# Patient Record
Sex: FEMALE | Race: WHITE
[De-identification: names, ages, dates, MRNs, and addresses within clinical notes are randomized per-mention and may not be internally consistent; named-entity substitution may affect disease eponyms.]

---

## 2019-10-23 ENCOUNTER — HOSPITAL ENCOUNTER (INPATIENT)
Dept: HOSPITAL 74 - YASAS | Age: 44
LOS: 23 days | Discharge: HOME | DRG: 772 | End: 2019-11-15
Attending: NEUROMUSCULOSKELETAL MEDICINE & OMM | Admitting: NEUROMUSCULOSKELETAL MEDICINE & OMM
Payer: COMMERCIAL

## 2019-10-23 VITALS — BODY MASS INDEX: 25.4 KG/M2

## 2019-10-23 DIAGNOSIS — R76.11: ICD-10-CM

## 2019-10-23 DIAGNOSIS — F64.0: ICD-10-CM

## 2019-10-23 DIAGNOSIS — F43.10: ICD-10-CM

## 2019-10-23 DIAGNOSIS — B18.2: ICD-10-CM

## 2019-10-23 DIAGNOSIS — F10.20: ICD-10-CM

## 2019-10-23 DIAGNOSIS — H55.00: ICD-10-CM

## 2019-10-23 DIAGNOSIS — B35.3: ICD-10-CM

## 2019-10-23 DIAGNOSIS — L21.0: ICD-10-CM

## 2019-10-23 DIAGNOSIS — F11.20: Primary | ICD-10-CM

## 2019-10-23 DIAGNOSIS — L98.8: ICD-10-CM

## 2019-10-23 DIAGNOSIS — F32.9: ICD-10-CM

## 2019-10-23 DIAGNOSIS — F14.10: ICD-10-CM

## 2019-10-23 DIAGNOSIS — Z88.0: ICD-10-CM

## 2019-10-23 DIAGNOSIS — L05.01: ICD-10-CM

## 2019-10-23 PROCEDURE — HZ42ZZZ GROUP COUNSELING FOR SUBSTANCE ABUSE TREATMENT, COGNITIVE-BEHAVIORAL: ICD-10-PCS | Performed by: ALLERGY & IMMUNOLOGY

## 2019-10-23 NOTE — HP
CIWA Score





- Admission Criteria


[facility] Guidelines: Admission for Medically Managed Detox: 


Requires at least one of the followin. CIWA greater than 12


2. Seizures within the past 24 hours


3. Delirium tremens within the past 24 hours


4. Hallucinations within the past 24 hours


5. Acute intervention needed for co  occurring medical disorder


6. Acute intervention needed for co  occurring psychiatric disorder


7. Severe withdrawal that cannot be handled at a lower level of care (continued


    vomiting, continued diarrhea, abnormal vital signs) requiring intravenous


    medication and/or fluids


8. Pregnancy








Admission ROS S





- HPI


Chief Complaint: 





Here for rehab.


Allergies/Adverse Reactions: 


 Allergies











Allergy/AdvReac Type Severity Reaction Status Date / Time


 


Penicillins AdvReac  Hives Verified 10/23/19 19:04











History of Present Illness: 


45 yo first presentation to [facility] seeking rehab. Currently using crack and 

relapsing w/ opioids.





Crack use began at age 40. Currently smokes $50 to $200/day.





Heroin use began at age 23. Relapses w/ heroin via IV. States on START 

Methadone Maintenance OTP x 6 months. Currently on 100 mg methadone PO daily. 

Last medicated today. Has a Narcan kit.





Nicotine use began at 40. Smokes about 5-10 cig/day.





Alcohol use began at age 12. Used to drink a liter of whiskey per day and 

stopped in  after pancreatitis.  


Denies seizures.


One overdose in .   Last blackout approx 4 years ago.





PMHx: Pilonidal cyst; PPD+; hx Hep C, Klinefelter Syndrome, Transgender w/o 

gender reassignment. 


On PreP. 





MHx: PTSD; Depression. Does not see a MH Provider. Not on meds. Denies thoughts 

of harming self or others. 





SHx: Homeless. Unemployed. Denies legal issues.











Patient Name: Raul Diaz YOB: 1975


 


Address: [address] [place] [id number] Sex: Male














 Rx Written Rx Dispensed Drug Quantity Days Supply Prescriber Name  


 


2019 acetaminophen-cod #3 tablet  10 2 [facility]  














Patient Name: Raul Diaz YOB: 1975


 


Address: [address] [place] [id number] Sex: Male














 Rx Written Rx Dispensed Drug Quantity Days Supply Prescriber Name  


 


2019 buprenorphine 2 mg tablet sl  10 5 Ghislaine Bob MD

  


 


2019 buprenorphine 2 mg tablet sl  30 15 Galo Bhakta MD  


 


2018 buprenorphine 2 mg tablet sl  30 15 Galo Bhakta MD  


 


10/25/2018 10/25/2018 zubsolv 1.4-0.36 mg tablet sl  10 18 Galo Bhakta MD  














Patient Name: Raul Diaz YOB: 1975


 


Address: [place] [address] Sex: Male














 Rx Written Rx Dispensed Drug Quantity Days Supply Prescriber Name  


 


2018 buprenorphine 2 mg tablet sl  30 15 Wellington Avlia  














Patient Name: Raul Diaz YOB: 1975


 


Address: [place] [address] Sex: Male














 Rx Written Rx Dispensed Drug Quantity Days Supply Prescriber Name  


 


2018 zubsolv 1.4-0.36 mg tablet sl  14 7 Wellington Avila  








Search Terms: Raul Diaz, 1975 


Search Date: 10/23/2019 09:28:02 PM 


States Searched: [place], [place], [place], [place], [place], [place], [place] 


The Drug Utilization Report below displays the controlled substance 

prescriptions, if any, that were dispensed in the indicated state(s). The 

information displayed on this report is compiled from requests submitted to 

other states' PMPs, and accurately reflects the information as returned by 

them. Blank fields indicate data not provided by other state.


This report was requested by: Tanisha Santiago | Reference #: [id number] 


There are no results for the search terms that you entered. 


45 yo p


Exam Limitations: No Limitations





- Ebola screening


Have you traveled outside of the country in the last 21 days: No


Have you had contact with anyone from an Ebola affected area: No


Have you been sick,other than usual withdrawal symptoms: No


Do you have a fever: No





- Review of Systems


Constitutional: No Symptoms Reported


EENT: reports: Dental Problems (Broken molor.)


Respiratory: reports: No Symptoms reported


Cardiac: reports: No Symptoms Reported


GI: reports: No Symptoms Reported


: reports: No Symptoms Reported


Musculoskeletal: reports: Other (Chronic (L) arm pain since . (L) foot pain 

since . Increases w/ cold or wet weather. Improves w/ dryer temps.)


Integumentary: reports: Other (Buttock/tail bone @ point of pilonidal cyst)


Neuro: reports: No Symptoms reported


Endocrine: reports: No Symptoms Reported


Hematology: reports: No Symptoms Reported


Psychiatric: reports: Orientated x3, Anxious, Depressed (Denies thoughts of 

harming self or others.)





Patient History





- PPD History


Previous Implant?: Yes


Documented Results: Positive w/o proof (No treatment. Last CXR neg)


Implanted On Prior R Admission?: No


PPD to be Administered?: No





- Reproductive History


Patient is a Female of Child Bearing Age (11 -55 yrs old): No


Patient Pregnant: No





- Smoking Cessation


Smoking history: Current every day smoker


Have you smoked in the past 12 months: Yes


Aproximately how many cigarettes per day: 10


Hx Chewing Tobacco Use: No


Initiated information on smoking cessation: Yes


'Breaking Loose' booklet given: 10/23/19





- Substance & Tx. History


Hx Alcohol Use: Yes


Hx Substance Use: Yes


Substance Use Type: Alcohol, Cocaine, Heroin


Hx Substance Use Treatment: Yes (detox, Currently on MMTP)





- Substances abused


  ** Crack


Other (specify): cocaine


Substance route: Smoking


Frequency: Daily


Amount used: 50 to 200 hundred dollars


Age of first use: 40


Date of last use: 10/23/19





  ** Alcohol


Substance route: Oral


Frequency: 1-2 times per week


Amount used: 1 to 24 ounce of beer.


Age of first use: 12


Date of last use: 10/23/19





Admission Physical Exam [facility]





- Vital Signs


Vital Signs: 


 Vital Signs - 24 hr











  10/23/19





  19:02


 


Temperature 98.9 F


 


Pulse Rate 67


 


Respiratory 20





Rate 


 


Blood Pressure 116/69














- Physical


General Appearance: Yes: Nourished


HEENTM: Yes: EOMI (Jerking movement of eyes upon lateral gaze), Hearing grossly 

Normal, Normocephalic, Normal Voice, YING, Pharynx Normal


Respiratory: Yes: Lungs Clear, Normal Breath Sounds, No Respiratory Distress


Neck: Yes: Other (Possible enlarged thyroid)


Breast: Yes: Breasts Symetrical


Cardiology: Yes: Regular Rhythm, Regular Rate, S1, S2


Abdominal: Yes: Normal Bowel Sounds, Non Tender, Flat, Soft


Genitourinary: Yes: Within Normal Limits


Back: Yes: Normal Inspection


Musculoskeletal: Yes: full range of Motion, Gait Steady


Extremities: Yes: Normal Capillary Refill, Normal Range of Motion


Neurological: Yes: CNs II-XII NML intact (Jerking movement of eyes upon lateral 

gaze), Fully Oriented, Alert, Motor Strength 5/5, Normal Response


Integumentary: Yes: Normal Color, Warm, Track Marks (Popliteal area - w/ 

increased induration and warmth), Other (Increased warmth, erythema, and 

tenderness to (L) and (R) inner buttock area w/ small lesions w/o drainage.)


Lymphatic: Yes: Within Normal Limits





- Diagnostic


(1) Cocaine dependence, uncomplicated


Current Visit: Yes   Status: Chronic   





(2) Nicotine dependence, unspecified, uncomplicated


Current Visit: Yes   Status: Chronic   


Qualifiers: 


   Nicotine product type: cigarettes   Qualified Code(s): F17.210 - Nicotine 

dependence, cigarettes, uncomplicated   





(3) Opioid dependence on agonist therapy


Current Visit: Yes   Status: Chronic   Comment: w/ relapse   





(4) Track marks due to intravenous drug abuse


Current Visit: Yes   Status: Chronic   





(5) Alcohol use disorder, moderate, in early remission


Current Visit: Yes   Status: Acute   





(6) History of positive PPD, untreated


Current Visit: Yes   Status: Chronic   





(7) Pilonidal cyst with abscess


Current Visit: Yes   Status: Chronic   





(8) Unspecified nystagmus


Current Visit: Yes   Status: Acute   





Cleared for Admission [facility]





- Detox or Rehab


Claeared for Rehab Admission: Yes





Breathalyzer





- Breathalyzer


Breathalyzer: 0





Urine Drug Screen





- Test Device


Lot number: [id number]


Expiration date: 21





- Control


Is test valid?: Yes





- Results


Drug screen NEGATIVE: No


Urine drug screen results: [place]-Cocaine, FEN-Fentanyl, MOP-Opiates, MTD-Methadone

, BZO-Benzodiazepines





Inpatient Rehab Admission





- Rehab Decision to Admit


Inpatient rehab admission?: Yes





- Initial Determination


Are CD services needed?: Yes


Free of communicable disease: Yes


Not in need of hospitalization: Yes





- Rehab Admission Criteria


Previous failed treatment: Yes


Poor recovery environment: Yes


Comorbidities: Yes


Lacks judgement: Yes


Patient is meeting Inpatient Rehab admission criteria:: Yes

## 2019-10-24 LAB
APPEARANCE UR: CLEAR
BILIRUB UR STRIP.AUTO-MCNC: NEGATIVE MG/DL
COLOR UR: YELLOW
KETONES UR QL STRIP: NEGATIVE
LEUKOCYTE ESTERASE UR QL STRIP.AUTO: NEGATIVE
NITRITE UR QL STRIP: NEGATIVE
PH UR: 6 [PH] (ref 5–8)
PROT UR QL STRIP: NEGATIVE
PROT UR QL STRIP: NEGATIVE
SP GR UR: 1.02 (ref 1.01–1.03)
UROBILINOGEN UR STRIP-MCNC: 0.2 MG/DL (ref 0.2–1)

## 2019-10-24 RX ADMIN — CEPHALEXIN SCH MG: 500 CAPSULE ORAL at 23:07

## 2019-10-24 RX ADMIN — Medication SCH TAB: at 23:08

## 2019-10-24 RX ADMIN — CEPHALEXIN SCH MG: 500 CAPSULE ORAL at 06:45

## 2019-10-24 RX ADMIN — HYDROXYZINE PAMOATE PRN MG: 25 CAPSULE ORAL at 12:17

## 2019-10-24 RX ADMIN — SULFAMETHOXAZOLE AND TRIMETHOPRIM SCH EACH: 800; 160 TABLET ORAL at 10:27

## 2019-10-24 RX ADMIN — SPIRONOLACTONE SCH MG: 25 TABLET, FILM COATED ORAL at 10:28

## 2019-10-24 RX ADMIN — SULFAMETHOXAZOLE AND TRIMETHOPRIM SCH EACH: 800; 160 TABLET ORAL at 00:00

## 2019-10-24 RX ADMIN — IBUPROFEN PRN MG: 600 TABLET, FILM COATED ORAL at 21:38

## 2019-10-24 RX ADMIN — SPIRONOLACTONE SCH MG: 25 TABLET, FILM COATED ORAL at 23:07

## 2019-10-24 RX ADMIN — CEPHALEXIN SCH MG: 500 CAPSULE ORAL at 12:16

## 2019-10-24 RX ADMIN — SULFAMETHOXAZOLE AND TRIMETHOPRIM SCH EACH: 800; 160 TABLET ORAL at 21:37

## 2019-10-24 RX ADMIN — Medication SCH TAB: at 10:27

## 2019-10-24 RX ADMIN — NICOTINE SCH MG: 7 PATCH TRANSDERMAL at 10:32

## 2019-10-24 RX ADMIN — CEPHALEXIN SCH MG: 500 CAPSULE ORAL at 17:55

## 2019-10-24 RX ADMIN — Medication PRN MG: at 00:02

## 2019-10-24 RX ADMIN — Medication SCH MG: at 00:00

## 2019-10-24 RX ADMIN — Medication SCH MG: at 21:37

## 2019-10-24 RX ADMIN — IBUPROFEN PRN MG: 600 TABLET, FILM COATED ORAL at 10:34

## 2019-10-24 RX ADMIN — Medication SCH TAB: at 10:28

## 2019-10-24 RX ADMIN — METHADONE HYDROCHLORIDE SCH MG: 40 TABLET ORAL at 08:42

## 2019-10-24 RX ADMIN — CEPHALEXIN SCH MG: 500 CAPSULE ORAL at 00:00

## 2019-10-25 RX ADMIN — Medication PRN APPLIC: at 17:30

## 2019-10-25 RX ADMIN — CEPHALEXIN SCH MG: 500 CAPSULE ORAL at 18:52

## 2019-10-25 RX ADMIN — SULFAMETHOXAZOLE AND TRIMETHOPRIM SCH EACH: 800; 160 TABLET ORAL at 21:38

## 2019-10-25 RX ADMIN — SPIRONOLACTONE SCH MG: 25 TABLET, FILM COATED ORAL at 10:13

## 2019-10-25 RX ADMIN — NICOTINE SCH MG: 7 PATCH TRANSDERMAL at 10:14

## 2019-10-25 RX ADMIN — CEPHALEXIN SCH MG: 500 CAPSULE ORAL at 13:00

## 2019-10-25 RX ADMIN — SULFAMETHOXAZOLE AND TRIMETHOPRIM SCH EACH: 800; 160 TABLET ORAL at 10:14

## 2019-10-25 RX ADMIN — SPIRONOLACTONE SCH MG: 25 TABLET, FILM COATED ORAL at 21:35

## 2019-10-25 RX ADMIN — Medication PRN MG: at 21:35

## 2019-10-25 RX ADMIN — Medication SCH TAB: at 10:14

## 2019-10-25 RX ADMIN — IBUPROFEN PRN MG: 600 TABLET, FILM COATED ORAL at 18:52

## 2019-10-25 RX ADMIN — Medication SCH TAB: at 21:38

## 2019-10-25 RX ADMIN — CEPHALEXIN SCH MG: 500 CAPSULE ORAL at 06:43

## 2019-10-25 RX ADMIN — METHADONE HYDROCHLORIDE SCH MG: 40 TABLET ORAL at 06:42

## 2019-10-25 RX ADMIN — Medication SCH TAB: at 11:00

## 2019-10-25 RX ADMIN — Medication SCH MG: at 21:35

## 2019-10-26 LAB
APPEARANCE UR: CLEAR
BILIRUB UR STRIP.AUTO-MCNC: NEGATIVE MG/DL
COLOR UR: YELLOW
KETONES UR QL STRIP: (no result)
LEUKOCYTE ESTERASE UR QL STRIP.AUTO: NEGATIVE
NITRITE UR QL STRIP: NEGATIVE
PH UR: 5.5 [PH] (ref 5–8)
PROT UR QL STRIP: NEGATIVE
PROT UR QL STRIP: NEGATIVE
SP GR UR: 1.02 (ref 1.01–1.03)
UROBILINOGEN UR STRIP-MCNC: 0.2 MG/DL (ref 0.2–1)

## 2019-10-26 RX ADMIN — LOPERAMIDE HYDROCHLORIDE PRN MG: 2 CAPSULE ORAL at 20:11

## 2019-10-26 RX ADMIN — CEPHALEXIN SCH: 500 CAPSULE ORAL at 00:20

## 2019-10-26 RX ADMIN — CEPHALEXIN SCH MG: 500 CAPSULE ORAL at 23:20

## 2019-10-26 RX ADMIN — NICOTINE SCH MG: 7 PATCH TRANSDERMAL at 10:20

## 2019-10-26 RX ADMIN — SPIRONOLACTONE SCH MG: 25 TABLET, FILM COATED ORAL at 10:22

## 2019-10-26 RX ADMIN — Medication SCH TAB: at 10:22

## 2019-10-26 RX ADMIN — Medication SCH MG: at 21:32

## 2019-10-26 RX ADMIN — Medication SCH TAB: at 21:33

## 2019-10-26 RX ADMIN — CEPHALEXIN SCH MG: 500 CAPSULE ORAL at 07:06

## 2019-10-26 RX ADMIN — IBUPROFEN PRN MG: 600 TABLET, FILM COATED ORAL at 07:08

## 2019-10-26 RX ADMIN — Medication PRN MG: at 21:32

## 2019-10-26 RX ADMIN — SULFAMETHOXAZOLE AND TRIMETHOPRIM SCH EACH: 800; 160 TABLET ORAL at 10:22

## 2019-10-26 RX ADMIN — SPIRONOLACTONE SCH MG: 25 TABLET, FILM COATED ORAL at 21:34

## 2019-10-26 RX ADMIN — SULFAMETHOXAZOLE AND TRIMETHOPRIM SCH EACH: 800; 160 TABLET ORAL at 21:32

## 2019-10-26 RX ADMIN — HYDROXYZINE PAMOATE PRN MG: 25 CAPSULE ORAL at 21:32

## 2019-10-26 RX ADMIN — CEPHALEXIN SCH MG: 500 CAPSULE ORAL at 12:01

## 2019-10-26 RX ADMIN — METHADONE HYDROCHLORIDE SCH MG: 40 TABLET ORAL at 07:06

## 2019-10-26 RX ADMIN — CEPHALEXIN SCH MG: 500 CAPSULE ORAL at 17:00

## 2019-10-27 RX ADMIN — Medication SCH TAB: at 21:23

## 2019-10-27 RX ADMIN — CEPHALEXIN SCH MG: 500 CAPSULE ORAL at 12:34

## 2019-10-27 RX ADMIN — SULFAMETHOXAZOLE AND TRIMETHOPRIM SCH EACH: 800; 160 TABLET ORAL at 21:24

## 2019-10-27 RX ADMIN — NICOTINE SCH MG: 7 PATCH TRANSDERMAL at 10:30

## 2019-10-27 RX ADMIN — SPIRONOLACTONE SCH MG: 25 TABLET, FILM COATED ORAL at 21:22

## 2019-10-27 RX ADMIN — LOPERAMIDE HYDROCHLORIDE PRN MG: 2 CAPSULE ORAL at 15:24

## 2019-10-27 RX ADMIN — CEPHALEXIN SCH MG: 500 CAPSULE ORAL at 23:35

## 2019-10-27 RX ADMIN — Medication SCH TAB: at 10:28

## 2019-10-27 RX ADMIN — SULFAMETHOXAZOLE AND TRIMETHOPRIM SCH EACH: 800; 160 TABLET ORAL at 10:30

## 2019-10-27 RX ADMIN — CEPHALEXIN SCH MG: 500 CAPSULE ORAL at 06:39

## 2019-10-27 RX ADMIN — Medication SCH MG: at 21:22

## 2019-10-27 RX ADMIN — Medication SCH TAB: at 10:30

## 2019-10-27 RX ADMIN — HYDROXYZINE PAMOATE PRN MG: 25 CAPSULE ORAL at 21:23

## 2019-10-27 RX ADMIN — SPIRONOLACTONE SCH MG: 25 TABLET, FILM COATED ORAL at 10:29

## 2019-10-27 RX ADMIN — Medication PRN MG: at 21:22

## 2019-10-27 RX ADMIN — CEPHALEXIN SCH MG: 500 CAPSULE ORAL at 17:35

## 2019-10-27 RX ADMIN — METHADONE HYDROCHLORIDE SCH MG: 40 TABLET ORAL at 06:40

## 2019-10-28 RX ADMIN — HYDROXYZINE PAMOATE PRN MG: 25 CAPSULE ORAL at 21:15

## 2019-10-28 RX ADMIN — NICOTINE SCH MG: 7 PATCH TRANSDERMAL at 10:35

## 2019-10-28 RX ADMIN — Medication PRN MG: at 21:15

## 2019-10-28 RX ADMIN — Medication SCH TAB: at 22:45

## 2019-10-28 RX ADMIN — SULFAMETHOXAZOLE AND TRIMETHOPRIM SCH EACH: 800; 160 TABLET ORAL at 10:34

## 2019-10-28 RX ADMIN — METHADONE HYDROCHLORIDE SCH MG: 40 TABLET ORAL at 06:20

## 2019-10-28 RX ADMIN — CEPHALEXIN SCH MG: 500 CAPSULE ORAL at 18:45

## 2019-10-28 RX ADMIN — Medication SCH TAB: at 10:35

## 2019-10-28 RX ADMIN — SULFAMETHOXAZOLE AND TRIMETHOPRIM SCH EACH: 800; 160 TABLET ORAL at 21:15

## 2019-10-28 RX ADMIN — SPIRONOLACTONE SCH MG: 25 TABLET, FILM COATED ORAL at 10:35

## 2019-10-28 RX ADMIN — SPIRONOLACTONE SCH MG: 25 TABLET, FILM COATED ORAL at 21:16

## 2019-10-28 RX ADMIN — CEPHALEXIN SCH MG: 500 CAPSULE ORAL at 12:49

## 2019-10-28 RX ADMIN — CEPHALEXIN SCH MG: 500 CAPSULE ORAL at 23:17

## 2019-10-28 RX ADMIN — Medication SCH TAB: at 10:34

## 2019-10-28 RX ADMIN — LOPERAMIDE HYDROCHLORIDE PRN MG: 2 CAPSULE ORAL at 06:21

## 2019-10-28 RX ADMIN — CEPHALEXIN SCH MG: 500 CAPSULE ORAL at 06:19

## 2019-10-28 RX ADMIN — Medication SCH MG: at 21:15

## 2019-10-28 NOTE — PN
[facility] Progress Note


Note: 


PATIENT WOULD LIKE TO APPLY DRESSING HE WAS USING AS OUTPATIENT TO BILATERAL 

BREASTS SURGICAL SCARS. PATIENT STATES IT WAS USED AS A PROTECTIVE DRESSING BUT 

FORGOT NAME OF DRESSING. PATIENT TO CALL MD OFFICE FOR APPLIANCE NAME AND 

PROVIDE TO NURSING STAFF/NP FOR ORDERING IF AVAILABLE AT [facility]. 


 Vital Signs











Temperature  97.8 F   10/28/19 07:31


 


Pulse Rate  65   10/28/19 07:31


 


Respiratory Rate  16   10/28/19 07:31


 


Blood Pressure  110/74   10/28/19 07:31


 


O2 Sat by Pulse Oximetry (%)

## 2019-10-29 RX ADMIN — CEPHALEXIN SCH MG: 500 CAPSULE ORAL at 17:46

## 2019-10-29 RX ADMIN — CEPHALEXIN SCH MG: 500 CAPSULE ORAL at 06:25

## 2019-10-29 RX ADMIN — SULFAMETHOXAZOLE AND TRIMETHOPRIM SCH EACH: 800; 160 TABLET ORAL at 10:44

## 2019-10-29 RX ADMIN — Medication SCH TAB: at 10:44

## 2019-10-29 RX ADMIN — CEPHALEXIN SCH MG: 500 CAPSULE ORAL at 12:13

## 2019-10-29 RX ADMIN — Medication PRN MG: at 21:10

## 2019-10-29 RX ADMIN — CEPHALEXIN SCH MG: 500 CAPSULE ORAL at 23:48

## 2019-10-29 RX ADMIN — Medication SCH MG: at 21:09

## 2019-10-29 RX ADMIN — METHADONE HYDROCHLORIDE SCH MG: 40 TABLET ORAL at 06:24

## 2019-10-29 RX ADMIN — SPIRONOLACTONE SCH MG: 25 TABLET, FILM COATED ORAL at 10:43

## 2019-10-29 RX ADMIN — NICOTINE SCH MG: 7 PATCH TRANSDERMAL at 10:44

## 2019-10-29 RX ADMIN — LOPERAMIDE HYDROCHLORIDE PRN MG: 2 CAPSULE ORAL at 10:45

## 2019-10-29 RX ADMIN — Medication SCH TAB: at 21:13

## 2019-10-29 RX ADMIN — SPIRONOLACTONE SCH MG: 25 TABLET, FILM COATED ORAL at 21:10

## 2019-10-29 RX ADMIN — Medication SCH TAB: at 10:45

## 2019-10-29 NOTE — PN
[facility] Progress Note (SOAP)


Subjective: 


patient c/o diarrhea. Is on two abx for cyst


Objective: 


General: No apparent distress


Lungs: clear


Heart: s1 s2


Abd: soft, non-tender, +BS


Neuro: Cn 2-12 intact


10/29/19 15:34





Assessment: 


Diarrhea


10/29/19 15:35





Plan: 


Bactrim discontinued. Continue imodium and Bacid. Continue to monitor.

## 2019-10-30 RX ADMIN — Medication PRN MG: at 21:18

## 2019-10-30 RX ADMIN — Medication SCH TAB: at 10:25

## 2019-10-30 RX ADMIN — CEPHALEXIN SCH MG: 500 CAPSULE ORAL at 18:23

## 2019-10-30 RX ADMIN — SPIRONOLACTONE SCH MG: 25 TABLET, FILM COATED ORAL at 10:28

## 2019-10-30 RX ADMIN — SPIRONOLACTONE SCH MG: 25 TABLET, FILM COATED ORAL at 21:21

## 2019-10-30 RX ADMIN — Medication SCH MG: at 21:18

## 2019-10-30 RX ADMIN — CEPHALEXIN SCH MG: 500 CAPSULE ORAL at 11:54

## 2019-10-30 RX ADMIN — METHADONE HYDROCHLORIDE SCH MG: 40 TABLET ORAL at 06:26

## 2019-10-30 RX ADMIN — NICOTINE SCH MG: 7 PATCH TRANSDERMAL at 10:26

## 2019-10-30 RX ADMIN — CEPHALEXIN SCH MG: 500 CAPSULE ORAL at 23:47

## 2019-10-30 RX ADMIN — CEPHALEXIN SCH MG: 500 CAPSULE ORAL at 06:27

## 2019-10-30 RX ADMIN — Medication SCH TAB: at 21:18

## 2019-10-30 RX ADMIN — Medication SCH TAB: at 10:26

## 2019-10-31 RX ADMIN — METHADONE HYDROCHLORIDE SCH MG: 40 TABLET ORAL at 06:34

## 2019-10-31 RX ADMIN — SPIRONOLACTONE SCH MG: 25 TABLET, FILM COATED ORAL at 10:42

## 2019-10-31 RX ADMIN — NICOTINE SCH MG: 7 PATCH TRANSDERMAL at 10:42

## 2019-10-31 RX ADMIN — Medication PRN MG: at 21:46

## 2019-10-31 RX ADMIN — SPIRONOLACTONE SCH MG: 25 TABLET, FILM COATED ORAL at 21:44

## 2019-10-31 RX ADMIN — Medication SCH TAB: at 10:42

## 2019-10-31 RX ADMIN — Medication SCH TAB: at 10:41

## 2019-10-31 RX ADMIN — Medication SCH TAB: at 21:45

## 2019-10-31 RX ADMIN — Medication SCH MG: at 21:45

## 2019-10-31 RX ADMIN — IBUPROFEN PRN MG: 600 TABLET, FILM COATED ORAL at 12:41

## 2019-10-31 NOTE — PN
[facility] Progress Note


Note: 


Patient requesting CICA-Care Silicone gel sheets to reduce the scarification 

that may occur after surgery.  I called our storeroom and central supply; [facility] 

does not carry this item. I called the patient's pharmacy, which also did not 

carry this item, but carried an item called "Scar Away," however, the pharmacy 

would not deliver to this location. Then, I called [facility] pharmacy, who stated 

they would order the product, but the patient would have to pay for it out of 

pocket. The plastic surgeon's office was called to see if they would provide 

the gel sheets for the patient, but I was told that the patient would have to 

pay for them out of pocket. The patient was informed of the same.

## 2019-11-01 RX ADMIN — SPIRONOLACTONE SCH MG: 25 TABLET, FILM COATED ORAL at 22:36

## 2019-11-01 RX ADMIN — NICOTINE SCH MG: 7 PATCH TRANSDERMAL at 10:45

## 2019-11-01 RX ADMIN — Medication SCH: at 22:38

## 2019-11-01 RX ADMIN — Medication SCH APPLIC: at 15:14

## 2019-11-01 RX ADMIN — Medication SCH TAB: at 10:42

## 2019-11-01 RX ADMIN — Medication SCH TAB: at 10:41

## 2019-11-01 RX ADMIN — SPIRONOLACTONE SCH MG: 25 TABLET, FILM COATED ORAL at 10:42

## 2019-11-01 RX ADMIN — METHADONE HYDROCHLORIDE SCH MG: 40 TABLET ORAL at 06:36

## 2019-11-02 RX ADMIN — NICOTINE SCH MG: 7 PATCH TRANSDERMAL at 10:13

## 2019-11-02 RX ADMIN — SPIRONOLACTONE SCH MG: 25 TABLET, FILM COATED ORAL at 10:13

## 2019-11-02 RX ADMIN — Medication SCH APPLIC: at 10:14

## 2019-11-02 RX ADMIN — ALUMINUM HYDROXIDE, MAGNESIUM HYDROXIDE, AND SIMETHICONE PRN ML: 200; 200; 20 SUSPENSION ORAL at 19:54

## 2019-11-02 RX ADMIN — METHADONE HYDROCHLORIDE SCH MG: 40 TABLET ORAL at 06:42

## 2019-11-02 RX ADMIN — SPIRONOLACTONE SCH MG: 25 TABLET, FILM COATED ORAL at 21:40

## 2019-11-02 RX ADMIN — Medication SCH TAB: at 10:14

## 2019-11-02 RX ADMIN — Medication SCH TAB: at 10:13

## 2019-11-02 RX ADMIN — Medication SCH: at 21:41

## 2019-11-02 RX ADMIN — Medication SCH MG: at 21:40

## 2019-11-02 RX ADMIN — Medication PRN MG: at 21:40

## 2019-11-03 RX ADMIN — SPIRONOLACTONE SCH: 25 TABLET, FILM COATED ORAL at 21:33

## 2019-11-03 RX ADMIN — Medication SCH MG: at 21:32

## 2019-11-03 RX ADMIN — SPIRONOLACTONE SCH MG: 25 TABLET, FILM COATED ORAL at 10:06

## 2019-11-03 RX ADMIN — Medication SCH APPLIC: at 10:06

## 2019-11-03 RX ADMIN — Medication SCH TAB: at 21:52

## 2019-11-03 RX ADMIN — Medication SCH TAB: at 10:06

## 2019-11-03 RX ADMIN — Medication PRN MG: at 21:32

## 2019-11-03 RX ADMIN — METHADONE HYDROCHLORIDE SCH MG: 40 TABLET ORAL at 06:09

## 2019-11-03 RX ADMIN — NICOTINE SCH MG: 7 PATCH TRANSDERMAL at 10:07

## 2019-11-04 RX ADMIN — Medication SCH TAB: at 10:30

## 2019-11-04 RX ADMIN — SPIRONOLACTONE SCH MG: 25 TABLET, FILM COATED ORAL at 21:37

## 2019-11-04 RX ADMIN — SPIRONOLACTONE SCH MG: 25 TABLET, FILM COATED ORAL at 10:30

## 2019-11-04 RX ADMIN — Medication SCH TAB: at 21:38

## 2019-11-04 RX ADMIN — SELENIUM SULFIDE SCH APPLIC: 2.5 LOTION TOPICAL at 14:55

## 2019-11-04 RX ADMIN — NICOTINE SCH MG: 7 PATCH TRANSDERMAL at 10:33

## 2019-11-04 RX ADMIN — Medication SCH MG: at 21:34

## 2019-11-04 RX ADMIN — TOLNAFTATE SCH APPLIC: 10 CREAM TOPICAL at 21:35

## 2019-11-04 RX ADMIN — METHADONE HYDROCHLORIDE SCH MG: 40 TABLET ORAL at 06:27

## 2019-11-04 RX ADMIN — Medication PRN MG: at 21:37

## 2019-11-04 RX ADMIN — Medication SCH APPLIC: at 10:31

## 2019-11-04 NOTE — PN
[facility] Progress Note


Note: 





Patient c/o dry, itchy scalp and dry cracked feet. 





 Vital Signs











Temperature  97.4 F L  11/04/19 07:21


 


Pulse Rate  74   11/04/19 09:22


 


Respiratory Rate  18   11/04/19 07:21


 


Blood Pressure  123/77   11/04/19 09:22


 


O2 Sat by Pulse Oximetry (%)      








 Laboratory Tests











  10/24/19 10/26/19





  09:20 08:20


 


Urine Color  Yellow  Yellow


 


Urine Appearance  Clear  Clear


 


Urine pH  6.0  5.5


 


Ur Specific Gravity  1.022  1.023


 


Urine Protein  Negative  Negative


 


Urine Glucose (UA)  Negative  Negative


 


Urine Ketones  Negative  Trace H


 


Urine Blood  Negative  Negative


 


Urine Nitrite  Negative  Negative


 


Urine Bilirubin  Negative  Negative


 


Urine Urobilinogen  0.2  0.2


 


Ur Leukocyte Esterase  Negative  Negative








PE:





alert and oriented x 3


skin warm and dry


scalp + dry, flaking to scalp


+perrla, eoms intact bl


ext full rom, amb ad norberto


+ dry, cracked skin to plantar aspect of feet








A/P:





athletes foot


dry scalp/dandruff








will order Tinactin cream to feet


Selsun Blue shampoo for dandruff


monitor clinically

## 2019-11-05 RX ADMIN — METHADONE HYDROCHLORIDE SCH MG: 40 TABLET ORAL at 06:57

## 2019-11-05 RX ADMIN — NICOTINE SCH MG: 7 PATCH TRANSDERMAL at 10:03

## 2019-11-05 RX ADMIN — TOLNAFTATE SCH APPLIC: 10 CREAM TOPICAL at 10:01

## 2019-11-05 RX ADMIN — Medication SCH TAB: at 21:32

## 2019-11-05 RX ADMIN — SELENIUM SULFIDE SCH: 2.5 LOTION TOPICAL at 10:02

## 2019-11-05 RX ADMIN — Medication SCH TAB: at 10:00

## 2019-11-05 RX ADMIN — SPIRONOLACTONE SCH MG: 25 TABLET, FILM COATED ORAL at 21:32

## 2019-11-05 RX ADMIN — SPIRONOLACTONE SCH MG: 25 TABLET, FILM COATED ORAL at 09:59

## 2019-11-05 RX ADMIN — TOLNAFTATE SCH APPLIC: 10 CREAM TOPICAL at 21:33

## 2019-11-05 RX ADMIN — Medication SCH MG: at 21:31

## 2019-11-05 RX ADMIN — Medication PRN MG: at 21:31

## 2019-11-05 RX ADMIN — HYDROXYZINE PAMOATE PRN MG: 25 CAPSULE ORAL at 21:31

## 2019-11-05 RX ADMIN — Medication SCH APPLIC: at 10:01

## 2019-11-06 RX ADMIN — Medication SCH TAB: at 09:59

## 2019-11-06 RX ADMIN — SPIRONOLACTONE SCH MG: 25 TABLET, FILM COATED ORAL at 09:58

## 2019-11-06 RX ADMIN — Medication SCH: at 11:00

## 2019-11-06 RX ADMIN — Medication SCH TAB: at 21:45

## 2019-11-06 RX ADMIN — Medication PRN MG: at 21:44

## 2019-11-06 RX ADMIN — TOLNAFTATE SCH APPLIC: 10 CREAM TOPICAL at 09:59

## 2019-11-06 RX ADMIN — TOLNAFTATE SCH APPLIC: 10 CREAM TOPICAL at 21:46

## 2019-11-06 RX ADMIN — METHADONE HYDROCHLORIDE SCH MG: 40 TABLET ORAL at 06:26

## 2019-11-06 RX ADMIN — SPIRONOLACTONE SCH MG: 25 TABLET, FILM COATED ORAL at 21:44

## 2019-11-06 RX ADMIN — SELENIUM SULFIDE SCH APPLIC: 2.5 LOTION TOPICAL at 10:00

## 2019-11-06 RX ADMIN — HYDROXYZINE PAMOATE PRN MG: 25 CAPSULE ORAL at 21:44

## 2019-11-06 RX ADMIN — NICOTINE SCH MG: 7 PATCH TRANSDERMAL at 09:59

## 2019-11-06 RX ADMIN — Medication SCH MG: at 21:44

## 2019-11-07 RX ADMIN — METHADONE HYDROCHLORIDE SCH MG: 40 TABLET ORAL at 06:26

## 2019-11-07 RX ADMIN — Medication PRN MG: at 21:11

## 2019-11-07 RX ADMIN — SPIRONOLACTONE SCH MG: 25 TABLET, FILM COATED ORAL at 10:10

## 2019-11-07 RX ADMIN — Medication SCH MG: at 21:11

## 2019-11-07 RX ADMIN — Medication SCH TAB: at 10:10

## 2019-11-07 RX ADMIN — Medication SCH TAB: at 21:14

## 2019-11-07 RX ADMIN — TOLNAFTATE SCH: 10 CREAM TOPICAL at 21:14

## 2019-11-07 RX ADMIN — NICOTINE SCH MG: 7 PATCH TRANSDERMAL at 10:10

## 2019-11-07 RX ADMIN — ALUMINUM HYDROXIDE, MAGNESIUM HYDROXIDE, AND SIMETHICONE PRN ML: 200; 200; 20 SUSPENSION ORAL at 21:13

## 2019-11-07 RX ADMIN — TOLNAFTATE SCH APPLIC: 10 CREAM TOPICAL at 10:10

## 2019-11-07 RX ADMIN — Medication SCH TAB: at 10:43

## 2019-11-07 RX ADMIN — SELENIUM SULFIDE SCH APPLIC: 2.5 LOTION TOPICAL at 10:10

## 2019-11-07 RX ADMIN — Medication SCH APPLIC: at 10:09

## 2019-11-07 RX ADMIN — SPIRONOLACTONE SCH MG: 25 TABLET, FILM COATED ORAL at 21:12

## 2019-11-07 RX ADMIN — HYDROXYZINE PAMOATE PRN MG: 25 CAPSULE ORAL at 21:11

## 2019-11-08 RX ADMIN — Medication SCH APPLIC: at 10:05

## 2019-11-08 RX ADMIN — TOLNAFTATE SCH: 10 CREAM TOPICAL at 21:46

## 2019-11-08 RX ADMIN — Medication SCH TAB: at 10:04

## 2019-11-08 RX ADMIN — Medication SCH TAB: at 21:46

## 2019-11-08 RX ADMIN — Medication SCH MG: at 21:47

## 2019-11-08 RX ADMIN — NICOTINE SCH MG: 7 PATCH TRANSDERMAL at 10:04

## 2019-11-08 RX ADMIN — Medication PRN MG: at 21:46

## 2019-11-08 RX ADMIN — ALUMINUM HYDROXIDE, MAGNESIUM HYDROXIDE, AND SIMETHICONE PRN ML: 200; 200; 20 SUSPENSION ORAL at 14:50

## 2019-11-08 RX ADMIN — METHADONE HYDROCHLORIDE SCH MG: 40 TABLET ORAL at 06:38

## 2019-11-08 RX ADMIN — Medication SCH TAB: at 10:03

## 2019-11-08 RX ADMIN — SPIRONOLACTONE SCH MG: 25 TABLET, FILM COATED ORAL at 10:03

## 2019-11-08 RX ADMIN — TOLNAFTATE SCH APPLIC: 10 CREAM TOPICAL at 10:05

## 2019-11-08 RX ADMIN — SPIRONOLACTONE SCH MG: 25 TABLET, FILM COATED ORAL at 21:47

## 2019-11-08 RX ADMIN — SELENIUM SULFIDE SCH: 2.5 LOTION TOPICAL at 10:05

## 2019-11-09 RX ADMIN — NICOTINE SCH MG: 7 PATCH TRANSDERMAL at 10:00

## 2019-11-09 RX ADMIN — METHADONE HYDROCHLORIDE SCH MG: 40 TABLET ORAL at 06:42

## 2019-11-09 RX ADMIN — TOLNAFTATE SCH: 10 CREAM TOPICAL at 21:39

## 2019-11-09 RX ADMIN — Medication SCH TAB: at 09:58

## 2019-11-09 RX ADMIN — Medication PRN MG: at 21:40

## 2019-11-09 RX ADMIN — SELENIUM SULFIDE SCH APPLIC: 2.5 LOTION TOPICAL at 10:00

## 2019-11-09 RX ADMIN — Medication SCH TAB: at 21:42

## 2019-11-09 RX ADMIN — ALUMINUM HYDROXIDE, MAGNESIUM HYDROXIDE, AND SIMETHICONE PRN ML: 200; 200; 20 SUSPENSION ORAL at 15:54

## 2019-11-09 RX ADMIN — Medication SCH: at 11:00

## 2019-11-09 RX ADMIN — Medication SCH MG: at 21:39

## 2019-11-09 RX ADMIN — SPIRONOLACTONE SCH MG: 25 TABLET, FILM COATED ORAL at 09:58

## 2019-11-09 RX ADMIN — TOLNAFTATE SCH: 10 CREAM TOPICAL at 10:00

## 2019-11-09 RX ADMIN — HYDROXYZINE PAMOATE PRN MG: 25 CAPSULE ORAL at 21:40

## 2019-11-09 RX ADMIN — SPIRONOLACTONE SCH MG: 25 TABLET, FILM COATED ORAL at 21:40

## 2019-11-10 RX ADMIN — HYDROXYZINE PAMOATE PRN MG: 25 CAPSULE ORAL at 21:37

## 2019-11-10 RX ADMIN — TOLNAFTATE SCH: 10 CREAM TOPICAL at 21:39

## 2019-11-10 RX ADMIN — SPIRONOLACTONE SCH MG: 25 TABLET, FILM COATED ORAL at 21:40

## 2019-11-10 RX ADMIN — Medication PRN MG: at 21:37

## 2019-11-10 RX ADMIN — SELENIUM SULFIDE SCH: 2.5 LOTION TOPICAL at 10:12

## 2019-11-10 RX ADMIN — Medication SCH MG: at 21:37

## 2019-11-10 RX ADMIN — NICOTINE SCH MG: 7 PATCH TRANSDERMAL at 10:09

## 2019-11-10 RX ADMIN — Medication SCH TAB: at 21:39

## 2019-11-10 RX ADMIN — METHADONE HYDROCHLORIDE SCH MG: 40 TABLET ORAL at 06:30

## 2019-11-10 RX ADMIN — Medication SCH APPLIC: at 10:11

## 2019-11-10 RX ADMIN — Medication SCH TAB: at 10:10

## 2019-11-10 RX ADMIN — Medication SCH TAB: at 10:09

## 2019-11-10 RX ADMIN — TOLNAFTATE SCH: 10 CREAM TOPICAL at 10:12

## 2019-11-10 RX ADMIN — SPIRONOLACTONE SCH MG: 25 TABLET, FILM COATED ORAL at 10:10

## 2019-11-11 RX ADMIN — NICOTINE SCH MG: 7 PATCH TRANSDERMAL at 09:57

## 2019-11-11 RX ADMIN — HYDROXYZINE PAMOATE PRN MG: 25 CAPSULE ORAL at 21:37

## 2019-11-11 RX ADMIN — SPIRONOLACTONE SCH MG: 25 TABLET, FILM COATED ORAL at 09:57

## 2019-11-11 RX ADMIN — Medication SCH TAB: at 09:58

## 2019-11-11 RX ADMIN — Medication PRN BAR: at 09:56

## 2019-11-11 RX ADMIN — SPIRONOLACTONE SCH MG: 25 TABLET, FILM COATED ORAL at 21:39

## 2019-11-11 RX ADMIN — Medication SCH MG: at 21:37

## 2019-11-11 RX ADMIN — TOLNAFTATE SCH: 10 CREAM TOPICAL at 21:39

## 2019-11-11 RX ADMIN — METHADONE HYDROCHLORIDE SCH MG: 40 TABLET ORAL at 06:35

## 2019-11-11 RX ADMIN — Medication SCH TAB: at 21:39

## 2019-11-11 RX ADMIN — Medication PRN MG: at 21:37

## 2019-11-11 RX ADMIN — Medication SCH TAB: at 09:57

## 2019-11-11 RX ADMIN — TOLNAFTATE SCH APPLIC: 10 CREAM TOPICAL at 09:58

## 2019-11-11 RX ADMIN — SELENIUM SULFIDE SCH APPLIC: 2.5 LOTION TOPICAL at 09:59

## 2019-11-11 RX ADMIN — IBUPROFEN PRN MG: 600 TABLET, FILM COATED ORAL at 14:51

## 2019-11-11 RX ADMIN — Medication SCH: at 10:00

## 2019-11-12 RX ADMIN — TOLNAFTATE SCH: 10 CREAM TOPICAL at 09:58

## 2019-11-12 RX ADMIN — TOLNAFTATE SCH: 10 CREAM TOPICAL at 21:42

## 2019-11-12 RX ADMIN — SPIRONOLACTONE SCH MG: 25 TABLET, FILM COATED ORAL at 21:41

## 2019-11-12 RX ADMIN — Medication PRN MG: at 21:41

## 2019-11-12 RX ADMIN — Medication SCH MG: at 21:40

## 2019-11-12 RX ADMIN — Medication SCH TAB: at 09:58

## 2019-11-12 RX ADMIN — SPIRONOLACTONE SCH MG: 25 TABLET, FILM COATED ORAL at 09:58

## 2019-11-12 RX ADMIN — Medication SCH APPLIC: at 09:59

## 2019-11-12 RX ADMIN — Medication SCH TAB: at 21:41

## 2019-11-12 RX ADMIN — NICOTINE SCH MG: 7 PATCH TRANSDERMAL at 10:01

## 2019-11-12 RX ADMIN — METHADONE HYDROCHLORIDE SCH MG: 40 TABLET ORAL at 06:11

## 2019-11-13 RX ADMIN — CEPHALEXIN SCH MG: 500 CAPSULE ORAL at 21:27

## 2019-11-13 RX ADMIN — HYDROXYZINE PAMOATE PRN MG: 25 CAPSULE ORAL at 21:27

## 2019-11-13 RX ADMIN — METHADONE HYDROCHLORIDE SCH MG: 40 TABLET ORAL at 06:24

## 2019-11-13 RX ADMIN — Medication SCH TAB: at 09:51

## 2019-11-13 RX ADMIN — Medication SCH: at 09:52

## 2019-11-13 RX ADMIN — TOLNAFTATE SCH: 10 CREAM TOPICAL at 21:29

## 2019-11-13 RX ADMIN — EMTRICITABINE AND TENOFOVIR DISOPROXIL FUMARATE SCH TAB: 200; 300 TABLET, FILM COATED ORAL at 13:09

## 2019-11-13 RX ADMIN — MUPIROCIN SCH APPLIC: 2 CREAM TOPICAL at 21:29

## 2019-11-13 RX ADMIN — Medication SCH MG: at 21:26

## 2019-11-13 RX ADMIN — TOLNAFTATE SCH: 10 CREAM TOPICAL at 09:52

## 2019-11-13 RX ADMIN — Medication SCH TAB: at 21:28

## 2019-11-13 RX ADMIN — Medication PRN MG: at 21:26

## 2019-11-13 RX ADMIN — SPIRONOLACTONE SCH MG: 25 TABLET, FILM COATED ORAL at 21:27

## 2019-11-13 RX ADMIN — CEPHALEXIN SCH MG: 500 CAPSULE ORAL at 13:08

## 2019-11-13 RX ADMIN — Medication SCH TAB: at 09:52

## 2019-11-13 RX ADMIN — NICOTINE SCH MG: 7 PATCH TRANSDERMAL at 09:51

## 2019-11-13 RX ADMIN — SPIRONOLACTONE SCH MG: 25 TABLET, FILM COATED ORAL at 09:50

## 2019-11-13 NOTE — PN
[facility]HS Progress Note (SOAP)


Subjective: 


patient has hx of pionidal cysts. She was placed on Bactrim and Kefles upon 

admission to this facility.  She also has a hx of MRSA with pilionidal cysts. 

Also reports unprotected transactional sex prior to admission. She is concerned 

that she may have an STD. Patient was on PrEP prior to admission to hospital,

but it was stopped upon admission because she would not be having sexual 

relationships while in rehab. patient did not have blood drawn for HIV, RPR, Gc/

chlamydia although the test were ordered because she is a "hard stick." She is 

requesting those tests again. 


Objective: 


P/E;


General: No apparent distress


HEENTM: PERRLA, normocephalic


Lungs: clear


Heart: s1 s2


Abd; +BS


Skin: One raised, scaling, red lesion on left buttocks. 


11/13/19 11:42





Assessment: 


pionidal cyst


At risk for STI


At risk for HIV


11/13/19 11:45





Plan: 


pilonidal cyst-will start bactrim and bactroban.


HIV and STI: will order labs for tomorrow. 


Will restart Truvada (PrEP)- patient needs at least 21 days of medication to 

ensure she is protected. Therefore,PrEP needs to start now.

## 2019-11-14 LAB
ANION GAP SERPL CALC-SCNC: 6 MMOL/L (ref 8–16)
BASOPHILS # BLD: 0.9 % (ref 0–2)
BUN SERPL-MCNC: 20.2 MG/DL (ref 7–18)
CALCIUM SERPL-MCNC: 8.9 MG/DL (ref 8.5–10.1)
CHLORIDE SERPL-SCNC: 99 MMOL/L (ref 98–107)
CO2 SERPL-SCNC: 29 MMOL/L (ref 21–32)
CREAT SERPL-MCNC: 1 MG/DL (ref 0.55–1.3)
DEPRECATED RDW RBC AUTO: 14.4 % (ref 11.6–15.6)
EOSINOPHIL # BLD: 4.2 % (ref 0–4.5)
GLUCOSE SERPL-MCNC: 67 MG/DL (ref 74–106)
HCT VFR BLD CALC: 46 % (ref 32.4–45.2)
HGB BLD-MCNC: 15.2 GM/DL (ref 10.7–15.3)
LYMPHOCYTES # BLD: 24.4 % (ref 8–40)
MCH RBC QN AUTO: 30.9 PG (ref 25.7–33.7)
MCHC RBC AUTO-ENTMCNC: 33 G/DL (ref 32–36)
MCV RBC: 93.4 FL (ref 80–96)
MONOCYTES # BLD AUTO: 8.3 % (ref 3.8–10.2)
NEUTROPHILS # BLD: 62.2 % (ref 42.8–82.8)
PLATELET # BLD AUTO: 187 K/MM3 (ref 134–434)
PMV BLD: 10.9 FL (ref 7.5–11.1)
POTASSIUM SERPLBLD-SCNC: 4.2 MMOL/L (ref 3.5–5.1)
RBC # BLD AUTO: 4.93 M/MM3 (ref 3.6–5.2)
SODIUM SERPL-SCNC: 134 MMOL/L (ref 136–145)
WBC # BLD AUTO: 11.8 K/MM3 (ref 4–10)

## 2019-11-14 RX ADMIN — CEPHALEXIN SCH MG: 500 CAPSULE ORAL at 10:05

## 2019-11-14 RX ADMIN — METHADONE HYDROCHLORIDE SCH MG: 40 TABLET ORAL at 06:32

## 2019-11-14 RX ADMIN — MUPIROCIN SCH APPLIC: 2 CREAM TOPICAL at 21:26

## 2019-11-14 RX ADMIN — Medication SCH MG: at 21:23

## 2019-11-14 RX ADMIN — SPIRONOLACTONE SCH MG: 25 TABLET, FILM COATED ORAL at 10:04

## 2019-11-14 RX ADMIN — Medication SCH TAB: at 21:24

## 2019-11-14 RX ADMIN — Medication SCH TAB: at 10:04

## 2019-11-14 RX ADMIN — EMTRICITABINE AND TENOFOVIR DISOPROXIL FUMARATE SCH TAB: 200; 300 TABLET, FILM COATED ORAL at 10:06

## 2019-11-14 RX ADMIN — Medication SCH TAB: at 10:06

## 2019-11-14 RX ADMIN — Medication SCH: at 10:07

## 2019-11-14 RX ADMIN — TOLNAFTATE SCH: 10 CREAM TOPICAL at 10:07

## 2019-11-14 RX ADMIN — CEPHALEXIN SCH MG: 500 CAPSULE ORAL at 21:23

## 2019-11-14 RX ADMIN — MUPIROCIN SCH APPLIC: 2 CREAM TOPICAL at 10:05

## 2019-11-14 RX ADMIN — NICOTINE SCH MG: 7 PATCH TRANSDERMAL at 10:05

## 2019-11-14 RX ADMIN — SPIRONOLACTONE SCH MG: 25 TABLET, FILM COATED ORAL at 21:25

## 2019-11-14 RX ADMIN — HYDROXYZINE PAMOATE PRN MG: 25 CAPSULE ORAL at 21:23

## 2019-11-14 RX ADMIN — TOLNAFTATE SCH: 10 CREAM TOPICAL at 21:27

## 2019-11-14 RX ADMIN — IBUPROFEN PRN MG: 600 TABLET, FILM COATED ORAL at 17:57

## 2019-11-14 RX ADMIN — Medication PRN MG: at 21:23

## 2019-11-15 VITALS — TEMPERATURE: 97.7 F | HEART RATE: 59 BPM | SYSTOLIC BLOOD PRESSURE: 112 MMHG | DIASTOLIC BLOOD PRESSURE: 71 MMHG

## 2019-11-15 RX ADMIN — CEPHALEXIN SCH MG: 500 CAPSULE ORAL at 09:27

## 2019-11-15 RX ADMIN — Medication SCH TAB: at 09:27

## 2019-11-15 RX ADMIN — MUPIROCIN SCH APPLIC: 2 CREAM TOPICAL at 09:28

## 2019-11-15 RX ADMIN — EMTRICITABINE AND TENOFOVIR DISOPROXIL FUMARATE SCH: 200; 300 TABLET, FILM COATED ORAL at 09:28

## 2019-11-15 RX ADMIN — SPIRONOLACTONE SCH MG: 25 TABLET, FILM COATED ORAL at 09:27

## 2019-11-15 RX ADMIN — TOLNAFTATE SCH APPLIC: 10 CREAM TOPICAL at 09:28

## 2019-11-15 RX ADMIN — METHADONE HYDROCHLORIDE SCH MG: 40 TABLET ORAL at 06:29

## 2019-11-15 RX ADMIN — NICOTINE SCH MG: 7 PATCH TRANSDERMAL at 09:28

## 2019-11-15 RX ADMIN — Medication SCH: at 09:28

## 2019-11-15 NOTE — DS
[facility] Discharge Summary





- [facility] Discharge Summary


Admission Date: 10/23/19


Discharge Date: 11/15/19





- History


Present History: Alcohol dependence, Cocaine dependence, MMTP, Opioid dependence


Pertinent Past History: 


Information given on admission:


43 yo first presentation to [facility] seeking rehab. Currently using crack and 

relapsing w/ opioids.





Crack use began at age 40. Currently smokes $50 to $200/day.





Heroin use began at age 23. Relapses w/ heroin via IV. States on START 

Methadone Maintenance OTP x 6 months. Currently on 100 mg methadone PO daily. 

Last medicated today. Has a Narcan kit.





Nicotine use began at 40. Smokes about 5-10 cig/day.





Alcohol use began at age 12. Used to drink a liter of whiskey per day and 

stopped in 2011 after pancreatitis.  


Denies seizures.


One overdose in 2011.   Last blackout approx 4 years ago.





PMHx: Pilonidal cyst; PPD+; hx Hep C, Klinefelter Syndrome, Transgender w/o 

gender reassignment. 


On PreP. 





MHx: PTSD; Depression. Does not see a MH Provider. Not on meds. Denies thoughts 

of harming self or others. 





SHx: Homeless. Unemployed. Denies legal issues.








- Discharge Physical Exam


Vital Signs: 


 Vital Signs











Temperature  97.7 F   11/15/19 07:28


 


Pulse Rate  59 L  11/15/19 07:28


 


Respiratory Rate  18   11/15/19 07:28


 


Blood Pressure  112/71   11/15/19 07:28


 


O2 Sat by Pulse Oximetry (%)      








Alert o x 3


nad


oob ambulating with steady gait


cardiac;s1 s1,rrr


lungs;cta,naomi.


abdomen:soft,+bs,nt,nd


extremities/skin:no edema,full ROM, skin intact on LE;other skin areas 

deferred. 








***Pt to continue Keflex till finish treatment for 10 days for buttocks 

lesions. Rx sent to pt's pharmacy to  abx.


Pertinent Admission Physical Exam Findings: 





Skin lesions(Hx Pilonidal cyst)


Treated with Keflex and will continue with treatment till finished after 

discharge.


 Laboratory Tests











  10/24/19 10/26/19 11/14/19





  09:20 08:20 09:00


 


WBC   


 


RBC   


 


Hgb   


 


Hct   


 


MCV   


 


MCH   


 


MCHC   


 


RDW   


 


Plt Count   


 


MPV   


 


Absolute Neuts (auto)   


 


Neutrophils %   


 


Lymphocytes %   


 


Monocytes %   


 


Eosinophils %   


 


Basophils %   


 


Nucleated RBC %   


 


Sodium   


 


Potassium   


 


Chloride   


 


Carbon Dioxide   


 


Anion Gap   


 


BUN   


 


Creatinine   


 


Est GFR (CKD-EPI)AfAm   


 


Est GFR (CKD-EPI)NonAf   


 


Random Glucose   


 


Calcium   


 


Urine Color  Yellow  Yellow 


 


Urine Appearance  Clear  Clear 


 


Urine pH  6.0  5.5 


 


Ur Specific Gravity  1.022  1.023 


 


Urine Protein  Negative  Negative 


 


Urine Glucose (UA)  Negative  Negative 


 


Urine Ketones  Negative  Trace H 


 


Urine Blood  Negative  Negative 


 


Urine Nitrite  Negative  Negative 


 


Urine Bilirubin  Negative  Negative 


 


Urine Urobilinogen  0.2  0.2 


 


Ur Leukocyte Esterase  Negative  Negative 


 


RPR Titer    Nonreactive


 


HIV 1&2 Antibody Screen   


 


HIV P24 Antigen   














  11/14/19 11/14/19 11/14/19





  09:00 09:25 09:25


 


WBC    11.8 H


 


RBC    4.93


 


Hgb    15.2


 


Hct    46.0 H


 


MCV    93.4


 


MCH    30.9


 


MCHC    33.0


 


RDW    14.4


 


Plt Count    187


 


MPV    10.9


 


Absolute Neuts (auto)    7.3


 


Neutrophils %    62.2


 


Lymphocytes %    24.4


 


Monocytes %    8.3


 


Eosinophils %    4.2


 


Basophils %    0.9


 


Nucleated RBC %    0


 


Sodium   134 L 


 


Potassium   4.2 


 


Chloride   99 


 


Carbon Dioxide   29 


 


Anion Gap   6 L 


 


BUN   20.2 H 


 


Creatinine   1.0 


 


Est GFR (CKD-EPI)AfAm   80.47 


 


Est GFR (CKD-EPI)NonAf   69.43 


 


Random Glucose   67 L 


 


Calcium   8.9 


 


Urine Color   


 


Urine Appearance   


 


Urine pH   


 


Ur Specific Gravity   


 


Urine Protein   


 


Urine Glucose (UA)   


 


Urine Ketones   


 


Urine Blood   


 


Urine Nitrite   


 


Urine Bilirubin   


 


Urine Urobilinogen   


 


Ur Leukocyte Esterase   


 


RPR Titer   


 


HIV 1&2 Antibody Screen  Negative  


 


HIV P24 Antigen  Negative  








Pt declined to wait for STD result to be available. Pt has the rest of these 

lab results to follow up with his primary care doctor. Pt has been instructed 

to follow up with medical records for any documents needed. 





- Treatment


Discharge Condition: Discharge condition good





- Medication


Discharge Medications: 


Ambulatory Orders





Emtricitabine/Tenofovir [Truvada -] 1 tablet PO DAILY 10/23/19 


Estradiol Valerate [Delestrogen] 20 mg IM WEEKLY 10/23/19 


Spironolactone 100 mg PO BID 10/23/19 


Sulfamethoxazole/Trimethoprim [Bactrim DS -] 1 tablet PO BID 10/23/19 


Cephalexin Monohydrate [Keflex -] 500 mg PO BID #16 capsule 11/15/19 


Lactobacillus Acidophilus [Bacid -] 2 tab PO BID #32 tab 11/15/19 











- Medication-Assisted Treatment (MAT)


Medication-Assisted Treatment (MAT): No





- Discharge Instructions


Diet, activity, other medical instructions: 





Diet:Regular





Activity: oob ad norberto





Other medical instructions:follow up with  your primary care doctor Dr. Shady Carty at [facility] in the [place], [place].


Follow up with CD aftercare recommendation at [facility] as scheduled. 





****Pt reports appointment has been  scheduled on 11/22/19 for dental and 11/26/ 19 for primary care***





Encouraged to follow up.








- Diagnosis


(1) Cocaine dependence, uncomplicated


Current Visit: Yes   Status: Chronic   





(2) History of positive PPD, untreated


Current Visit: Yes   Status: Chronic   





(3) Nicotine dependence, unspecified, uncomplicated


Current Visit: Yes   Status: Chronic   


Qualifiers: 


   Nicotine product type: cigarettes   Qualified Code(s): F17.210 - Nicotine 

dependence, cigarettes, uncomplicated   





(4) Opioid dependence on agonist therapy


Current Visit: Yes   Status: Chronic   





(5) Pilonidal cyst with abscess


Current Visit: Yes   Status: Chronic   





(6) Track marks due to intravenous drug abuse


Current Visit: Yes   Status: Chronic   





(7) Alcohol dependence


Current Visit: Yes   Status: Chronic   


Qualifiers: 


   Substance use status: uncomplicated   Qualified Code(s): F10.20 - Alcohol 

dependence, uncomplicated   





- Follow-up Referral


Minutes to complete discharge: 30





- AMA


Did Patient Leave Against Medical Advice: No


Additional Comments: 





Pt reports  personal home [facility]. 

pt instructed to follow up with primary care for further management.